# Patient Record
Sex: MALE | Race: WHITE | Employment: PART TIME | URBAN - METROPOLITAN AREA
[De-identification: names, ages, dates, MRNs, and addresses within clinical notes are randomized per-mention and may not be internally consistent; named-entity substitution may affect disease eponyms.]

---

## 2018-07-02 ENCOUNTER — APPOINTMENT (OUTPATIENT)
Dept: ULTRASOUND IMAGING | Age: 18
End: 2018-07-02
Attending: NURSE PRACTITIONER
Payer: COMMERCIAL

## 2018-07-02 ENCOUNTER — HOSPITAL ENCOUNTER (EMERGENCY)
Age: 18
Discharge: HOME | End: 2018-07-02
Payer: COMMERCIAL

## 2018-07-02 ENCOUNTER — APPOINTMENT (OUTPATIENT)
Dept: GENERAL RADIOLOGY | Age: 18
End: 2018-07-02
Attending: NURSE PRACTITIONER
Payer: COMMERCIAL

## 2018-07-02 VITALS
WEIGHT: 165 LBS | BODY MASS INDEX: 21.17 KG/M2 | OXYGEN SATURATION: 98 % | HEIGHT: 74 IN | RESPIRATION RATE: 16 BRPM | SYSTOLIC BLOOD PRESSURE: 138 MMHG | TEMPERATURE: 100 F | DIASTOLIC BLOOD PRESSURE: 76 MMHG | HEART RATE: 89 BPM

## 2018-07-02 DIAGNOSIS — M25.40: ICD-10-CM

## 2018-07-02 DIAGNOSIS — S89.92XA INJURY OF LEFT KNEE, INITIAL ENCOUNTER: Primary | ICD-10-CM

## 2018-07-02 PROCEDURE — 73560 X-RAY EXAM OF KNEE 1 OR 2: CPT | Performed by: NURSE PRACTITIONER

## 2018-07-02 PROCEDURE — 99284 EMERGENCY DEPT VISIT MOD MDM: CPT

## 2018-07-02 PROCEDURE — 93971 EXTREMITY STUDY: CPT | Performed by: NURSE PRACTITIONER

## 2018-07-02 NOTE — ED PROVIDER NOTES
Patient Seen in: THE MEDICAL [facility] In [place]    History   Patient presents with:  Deep Vein Thrombosis (cardiovascular)    Stated Complaint: left calf swelling and pain for the last 2 weeks    25-year-old male presents today with complaints well-nourished. No distress. HENT:   Head: Normocephalic. Neck: Normal range of motion. Pulmonary/Chest: Effort normal and breath sounds normal.   Musculoskeletal:   Mild pain with palpation behind the left knee. Mild swelling noted to the area.   No

## 2023-01-12 ENCOUNTER — HOSPITAL ENCOUNTER (EMERGENCY)
Age: 23
Discharge: HOME | End: 2023-01-12
Attending: EMERGENCY MEDICINE
Payer: COMMERCIAL

## 2023-01-12 VITALS
WEIGHT: 215 LBS | DIASTOLIC BLOOD PRESSURE: 88 MMHG | HEIGHT: 75 IN | BODY MASS INDEX: 26.73 KG/M2 | SYSTOLIC BLOOD PRESSURE: 162 MMHG | RESPIRATION RATE: 18 BRPM | TEMPERATURE: 98 F | OXYGEN SATURATION: 97 % | HEART RATE: 113 BPM

## 2023-01-12 DIAGNOSIS — L60.0 INGROWN TOENAIL OF LEFT FOOT WITH INFECTION: Primary | ICD-10-CM

## 2023-01-12 PROCEDURE — 99283 EMERGENCY DEPT VISIT LOW MDM: CPT

## 2023-01-12 RX ORDER — CEPHALEXIN 500 MG/1
500 CAPSULE ORAL 4 TIMES DAILY
Qty: 40 CAPSULE | Refills: 0 | Status: SHIPPED | OUTPATIENT
Start: 2023-01-12 | End: 2023-01-22

## (undated) NOTE — ED AVS SNAPSHOT
Borrero Ulysses   MRN: [id number]    Department:  [facility] Emergency Department in [place]   Date of Visit:  7/2/2018           Disclosure     Insurance plans vary and the physician(s) referred by the ER may not be covered by your plan.  Please contact your tell this physician (or your personal doctor if your instructions are to return to your personal doctor) about any new or lasting problems. The primary care or specialist physician will see patients referred from the [facility] Emergency Department.  Arthor Bernheim